# Patient Record
Sex: MALE | Race: WHITE | URBAN - METROPOLITAN AREA
[De-identification: names, ages, dates, MRNs, and addresses within clinical notes are randomized per-mention and may not be internally consistent; named-entity substitution may affect disease eponyms.]

---

## 2022-03-18 PROBLEM — N41.1 CHRONIC PROSTATITIS: Status: ACTIVE | Noted: 2017-08-31

## 2022-03-18 PROBLEM — M54.12 CERVICAL RADICULOPATHY: Status: ACTIVE | Noted: 2020-04-15

## 2022-03-19 PROBLEM — M46.1 SACROILIITIS (HCC): Status: ACTIVE | Noted: 2017-09-20

## 2022-03-19 PROBLEM — R31.9 HEMATURIA: Status: ACTIVE | Noted: 2017-08-31

## 2022-03-19 PROBLEM — M54.50 ACUTE MIDLINE LOW BACK PAIN WITHOUT SCIATICA: Status: ACTIVE | Noted: 2020-04-15

## 2022-03-19 PROBLEM — M48.02 DEGENERATIVE CERVICAL SPINAL STENOSIS: Status: ACTIVE | Noted: 2020-04-15

## 2022-03-20 PROBLEM — R31.29 MICROHEMATURIA: Status: ACTIVE | Noted: 2017-05-31

## 2022-03-20 PROBLEM — N28.1 RENAL CYST, RIGHT: Status: ACTIVE | Noted: 2017-09-20

## 2024-01-15 SDOH — HEALTH STABILITY: PHYSICAL HEALTH: ON AVERAGE, HOW MANY MINUTES DO YOU ENGAGE IN EXERCISE AT THIS LEVEL?: 120 MIN

## 2024-01-15 SDOH — HEALTH STABILITY: PHYSICAL HEALTH: ON AVERAGE, HOW MANY DAYS PER WEEK DO YOU ENGAGE IN MODERATE TO STRENUOUS EXERCISE (LIKE A BRISK WALK)?: 3 DAYS

## 2024-01-16 ENCOUNTER — OFFICE VISIT (OUTPATIENT)
Dept: FAMILY MEDICINE CLINIC | Facility: CLINIC | Age: 69
End: 2024-01-16
Payer: MEDICARE

## 2024-01-16 VITALS
RESPIRATION RATE: 16 BRPM | BODY MASS INDEX: 29.89 KG/M2 | DIASTOLIC BLOOD PRESSURE: 70 MMHG | HEIGHT: 66 IN | SYSTOLIC BLOOD PRESSURE: 130 MMHG | OXYGEN SATURATION: 97 % | WEIGHT: 186 LBS | HEART RATE: 82 BPM | TEMPERATURE: 98.1 F

## 2024-01-16 DIAGNOSIS — I10 PRIMARY HYPERTENSION: ICD-10-CM

## 2024-01-16 DIAGNOSIS — Z87.891 FORMER SMOKER: ICD-10-CM

## 2024-01-16 DIAGNOSIS — Z76.89 ESTABLISHING CARE WITH NEW DOCTOR, ENCOUNTER FOR: Primary | ICD-10-CM

## 2024-01-16 DIAGNOSIS — G47.33 OSA (OBSTRUCTIVE SLEEP APNEA): ICD-10-CM

## 2024-01-16 PROCEDURE — 1036F TOBACCO NON-USER: CPT | Performed by: STUDENT IN AN ORGANIZED HEALTH CARE EDUCATION/TRAINING PROGRAM

## 2024-01-16 PROCEDURE — 3075F SYST BP GE 130 - 139MM HG: CPT | Performed by: STUDENT IN AN ORGANIZED HEALTH CARE EDUCATION/TRAINING PROGRAM

## 2024-01-16 PROCEDURE — 3017F COLORECTAL CA SCREEN DOC REV: CPT | Performed by: STUDENT IN AN ORGANIZED HEALTH CARE EDUCATION/TRAINING PROGRAM

## 2024-01-16 PROCEDURE — 1123F ACP DISCUSS/DSCN MKR DOCD: CPT | Performed by: STUDENT IN AN ORGANIZED HEALTH CARE EDUCATION/TRAINING PROGRAM

## 2024-01-16 PROCEDURE — G8484 FLU IMMUNIZE NO ADMIN: HCPCS | Performed by: STUDENT IN AN ORGANIZED HEALTH CARE EDUCATION/TRAINING PROGRAM

## 2024-01-16 PROCEDURE — 3078F DIAST BP <80 MM HG: CPT | Performed by: STUDENT IN AN ORGANIZED HEALTH CARE EDUCATION/TRAINING PROGRAM

## 2024-01-16 PROCEDURE — 99214 OFFICE O/P EST MOD 30 MIN: CPT | Performed by: STUDENT IN AN ORGANIZED HEALTH CARE EDUCATION/TRAINING PROGRAM

## 2024-01-16 PROCEDURE — G8417 CALC BMI ABV UP PARAM F/U: HCPCS | Performed by: STUDENT IN AN ORGANIZED HEALTH CARE EDUCATION/TRAINING PROGRAM

## 2024-01-16 PROCEDURE — G8427 DOCREV CUR MEDS BY ELIG CLIN: HCPCS | Performed by: STUDENT IN AN ORGANIZED HEALTH CARE EDUCATION/TRAINING PROGRAM

## 2024-01-16 SDOH — ECONOMIC STABILITY: INCOME INSECURITY: HOW HARD IS IT FOR YOU TO PAY FOR THE VERY BASICS LIKE FOOD, HOUSING, MEDICAL CARE, AND HEATING?: NOT HARD AT ALL

## 2024-01-16 SDOH — ECONOMIC STABILITY: HOUSING INSECURITY
IN THE LAST 12 MONTHS, WAS THERE A TIME WHEN YOU DID NOT HAVE A STEADY PLACE TO SLEEP OR SLEPT IN A SHELTER (INCLUDING NOW)?: NO

## 2024-01-16 SDOH — ECONOMIC STABILITY: FOOD INSECURITY: WITHIN THE PAST 12 MONTHS, THE FOOD YOU BOUGHT JUST DIDN'T LAST AND YOU DIDN'T HAVE MONEY TO GET MORE.: NEVER TRUE

## 2024-01-16 SDOH — ECONOMIC STABILITY: FOOD INSECURITY: WITHIN THE PAST 12 MONTHS, YOU WORRIED THAT YOUR FOOD WOULD RUN OUT BEFORE YOU GOT MONEY TO BUY MORE.: NEVER TRUE

## 2024-01-16 ASSESSMENT — ENCOUNTER SYMPTOMS
DIARRHEA: 0
COUGH: 0
CHEST TIGHTNESS: 0
NAUSEA: 0
SHORTNESS OF BREATH: 0
VOMITING: 0

## 2024-01-16 ASSESSMENT — PATIENT HEALTH QUESTIONNAIRE - PHQ9
SUM OF ALL RESPONSES TO PHQ9 QUESTIONS 1 & 2: 0
SUM OF ALL RESPONSES TO PHQ QUESTIONS 1-9: 0
1. LITTLE INTEREST OR PLEASURE IN DOING THINGS: 0
SUM OF ALL RESPONSES TO PHQ QUESTIONS 1-9: 0
2. FEELING DOWN, DEPRESSED OR HOPELESS: 0

## 2024-01-16 NOTE — PROGRESS NOTES
Dale A Schober is a 68 y.o. male (: 1955) presenting to address:    Chief Complaint   Patient presents with    Establish Care       Vitals:    24 1103   BP: 130/70   Pulse: 82   Resp: 16   Temp: 98.1 °F (36.7 °C)   SpO2: 97%       Coordination of Care Questionaire:   1. \"Have you been to the ER, urgent care clinic since your last visit?  Hospitalized since your last visit?\" No    2. \"Have you seen or consulted any other health care providers outside of the [facility] since your last visit?\" No     3. For patients aged 45-75: Has the patient had a colonoscopy / FIT/ Cologuard? No      If the patient is female:    4. For patients aged 40-74: Has the patient had a mammogram within the past 2 years? NA - based on age or sex      5. For patients aged 21-65: Has the patient had a pap smear? NA - based on age or sex    Advanced Directive:   1. Do you have an Advanced Directive? No    2. Would you like information on Advanced Directives? No    
OTHER SURGICAL HISTORY      biopsy    PROSTATE SURGERY  06:1998    Biopsy    UROLOGICAL SURGERY  2014    [facility], Dr. Nabil Sheldon, Transrectal ultrasound of prostate, multiple transperineal needle biopsies    UROLOGICAL SURGERY  2023    CYSTOSCOPY TRANSRECTAL ULTRASOUND OF THE PROSTATE; Dr Hameed       Family History   Problem Relation Age of Onset    Heart Attack Mother     Heart Attack Father     Heart Disease Paternal Uncle        No Active Allergies      Social History     Tobacco Use   Smoking Status Former    Current packs/day: 0.00    Average packs/day: 0.5 packs/day for 30.0 years (15.0 ttl pk-yrs)    Types: Cigarettes    Start date: 1970    Quit date: 2000    Years since quittin.0   Smokeless Tobacco Never       Social History     Substance and Sexual Activity   Alcohol Use Yes    Alcohol/week: 8.0 standard drinks of alcohol    Types: 4 Glasses of wine, 4 Cans of beer per week       Immunization History   Administered Date(s) Administered    Pneumococcal, PCV20, PREVNAR 20, (age 6w+), IM, 0.5mL 2024       Patient Active Problem List   Diagnosis    Cervical radiculopathy    Microscopic hematuria    Elevated prostate specific antigen (PSA)    Chronic prostatitis    Degenerative cervical spinal stenosis    Sacroiliitis (HCC)    Acute midline low back pain without sciatica    Tear meniscus knee    Hematuria    Prostatitis    ORALIA (obstructive sleep apnea)    Right knee pain    Renal cyst, right    Microhematuria    Impotence of organic origin         Current Outpatient Medications:     ezetimibe (ZETIA) 10 MG tablet, take 1 tablet by mouth once daily for cholesterol, Disp: , Rfl:     olmesartan (BENICAR) 40 MG tablet, Take 1 tablet by mouth daily for blood pressure, Disp: , Rfl:     rosuvastatin (CRESTOR) 40 MG tablet, , Disp: , Rfl:     tamsulosin (FLOMAX) 0.4 MG capsule, Take 1 capsule by mouth daily, Disp: 90 capsule, Rfl: 3    fluticasone (FLONASE) 50 MCG/ACT nasal spray,

## 2024-04-09 ENCOUNTER — TELEPHONE (OUTPATIENT)
Dept: FAMILY MEDICINE CLINIC | Facility: CLINIC | Age: 69
End: 2024-04-09

## 2024-04-09 NOTE — TELEPHONE ENCOUNTER
Pt was transferred by nurse triage for sob. Spoke with pt whom stated that he is requesting a referral for a CT scan due to nodule found on his lungs previously. [facility]

## 2024-09-17 ENCOUNTER — OFFICE VISIT (OUTPATIENT)
Dept: FAMILY MEDICINE CLINIC | Facility: CLINIC | Age: 69
End: 2024-09-17
Payer: MEDICARE

## 2024-09-17 VITALS
BODY MASS INDEX: 30.25 KG/M2 | HEART RATE: 78 BPM | HEIGHT: 66 IN | TEMPERATURE: 98 F | SYSTOLIC BLOOD PRESSURE: 131 MMHG | OXYGEN SATURATION: 98 % | DIASTOLIC BLOOD PRESSURE: 69 MMHG | RESPIRATION RATE: 16 BRPM | WEIGHT: 188.2 LBS

## 2024-09-17 DIAGNOSIS — I10 PRIMARY HYPERTENSION: Primary | ICD-10-CM

## 2024-09-17 DIAGNOSIS — R20.0 BILATERAL HAND NUMBNESS: ICD-10-CM

## 2024-09-17 DIAGNOSIS — E78.2 MIXED HYPERLIPIDEMIA: ICD-10-CM

## 2024-09-17 DIAGNOSIS — Z13.29 SCREENING FOR ENDOCRINE DISORDER: ICD-10-CM

## 2024-09-17 DIAGNOSIS — E66.3 OVERWEIGHT (BMI 25.0-29.9): ICD-10-CM

## 2024-09-17 PROBLEM — R31.9 HEMATURIA: Status: RESOLVED | Noted: 2017-08-31 | Resolved: 2024-09-17

## 2024-09-17 PROBLEM — M46.1 SACROILIITIS (HCC): Status: RESOLVED | Noted: 2017-09-20 | Resolved: 2024-09-17

## 2024-09-17 PROBLEM — M54.50 ACUTE MIDLINE LOW BACK PAIN WITHOUT SCIATICA: Status: RESOLVED | Noted: 2020-04-15 | Resolved: 2024-09-17

## 2024-09-17 PROCEDURE — 90653 IIV ADJUVANT VACCINE IM: CPT | Performed by: STUDENT IN AN ORGANIZED HEALTH CARE EDUCATION/TRAINING PROGRAM

## 2024-09-17 PROCEDURE — G0008 ADMIN INFLUENZA VIRUS VAC: HCPCS | Performed by: STUDENT IN AN ORGANIZED HEALTH CARE EDUCATION/TRAINING PROGRAM

## 2024-09-17 PROCEDURE — G8427 DOCREV CUR MEDS BY ELIG CLIN: HCPCS | Performed by: STUDENT IN AN ORGANIZED HEALTH CARE EDUCATION/TRAINING PROGRAM

## 2024-09-17 PROCEDURE — 1036F TOBACCO NON-USER: CPT | Performed by: STUDENT IN AN ORGANIZED HEALTH CARE EDUCATION/TRAINING PROGRAM

## 2024-09-17 PROCEDURE — 3017F COLORECTAL CA SCREEN DOC REV: CPT | Performed by: STUDENT IN AN ORGANIZED HEALTH CARE EDUCATION/TRAINING PROGRAM

## 2024-09-17 PROCEDURE — 3075F SYST BP GE 130 - 139MM HG: CPT | Performed by: STUDENT IN AN ORGANIZED HEALTH CARE EDUCATION/TRAINING PROGRAM

## 2024-09-17 PROCEDURE — 99214 OFFICE O/P EST MOD 30 MIN: CPT | Performed by: STUDENT IN AN ORGANIZED HEALTH CARE EDUCATION/TRAINING PROGRAM

## 2024-09-17 PROCEDURE — 3078F DIAST BP <80 MM HG: CPT | Performed by: STUDENT IN AN ORGANIZED HEALTH CARE EDUCATION/TRAINING PROGRAM

## 2024-09-17 PROCEDURE — 1123F ACP DISCUSS/DSCN MKR DOCD: CPT | Performed by: STUDENT IN AN ORGANIZED HEALTH CARE EDUCATION/TRAINING PROGRAM

## 2024-09-17 PROCEDURE — G8417 CALC BMI ABV UP PARAM F/U: HCPCS | Performed by: STUDENT IN AN ORGANIZED HEALTH CARE EDUCATION/TRAINING PROGRAM

## 2024-09-17 RX ORDER — NIFEDIPINE 60 MG/1
1 TABLET, EXTENDED RELEASE ORAL DAILY
COMMUNITY
Start: 2024-07-11

## 2024-09-17 ASSESSMENT — ENCOUNTER SYMPTOMS
DIARRHEA: 0
CHEST TIGHTNESS: 0
COUGH: 0
SHORTNESS OF BREATH: 0
NAUSEA: 0
VOMITING: 0

## 2024-10-25 ENCOUNTER — OFFICE VISIT (OUTPATIENT)
Age: 69
End: 2024-10-25

## 2024-10-25 VITALS — BODY MASS INDEX: 31.12 KG/M2 | HEIGHT: 66 IN | WEIGHT: 193.6 LBS

## 2024-10-25 DIAGNOSIS — G56.03 BILATERAL CARPAL TUNNEL SYNDROME: Primary | ICD-10-CM

## 2024-10-25 RX ORDER — SEMAGLUTIDE 0.5 MG/.5ML
0.5 INJECTION, SOLUTION SUBCUTANEOUS
COMMUNITY
Start: 2024-10-10

## 2024-10-25 RX ORDER — NITROGLYCERIN 0.4 MG/1
TABLET SUBLINGUAL
COMMUNITY
Start: 2024-10-22

## 2024-10-25 NOTE — PROGRESS NOTES
Dale A Schober is a 69 y.o. male right handed.  Worker's Compensation and legal considerations: none    Chief Complaint   Patient presents with    Hand Pain     Bilateral     Pain Score:   0 - No pain    Subjective:     Initial HPI: Patient presents today with complaints of bilateral hand numbness and tingling.  He reports it to start with the left first and then the right.    Date of onset: Approximately 2020  Injury: Yes: Comment: Remote feeling immediate pain when moving some cans.  Prior Treatment:  No  Contributory history: None    ROS: Review of Systems - General ROS: negative except HPI    Past Medical History:   Diagnosis Date    CAD (coronary artery disease) 06/05/2000    Elevated prostate specific antigen (PSA)     Hypertension     Impotence of organic origin     Microscopic hematuria     ORALIA (obstructive sleep apnea)     uses cpap machine every night    Right knee pain     Tear meniscus knee        Past Surgical History:   Procedure Laterality Date    EYE SURGERY  03/03/2022    Cataract    ORTHOPEDIC SURGERY      4 total orthopaedic surgeries    OTHER SURGICAL HISTORY  12/09    biopsy    OTHER SURGICAL HISTORY      Knee    OTHER SURGICAL HISTORY  12/07    biopsy    PROSTATE SURGERY  06:06/1998    Biopsy    UROLOGICAL SURGERY  1/20/2014    [facility], Dr. Nabil Sheldon, Transrectal ultrasound of prostate, multiple transperineal needle biopsies    UROLOGICAL SURGERY  08/03/2023    CYSTOSCOPY TRANSRECTAL ULTRASOUND OF THE PROSTATE; Dr Hameed        Current Outpatient Medications   Medication Sig Dispense Refill    Semaglutide-Weight Management (WEGOVY) 0.5 MG/0.5ML SOAJ SC injection Inject 0.5 mg into the skin      NITROSTAT 0.4 MG SL tablet       NIFEdipine (PROCARDIA XL) 60 MG extended release tablet Take 1 tablet by mouth daily      tamsulosin (FLOMAX) 0.4 MG capsule take 1 capsule by mouth once daily (Patient not taking: Reported on 9/17/2024) 30 capsule 0    ezetimibe (ZETIA) 10 MG tablet take 1 tablet by

## 2024-10-31 ENCOUNTER — OFFICE VISIT (OUTPATIENT)
Dept: FAMILY MEDICINE CLINIC | Facility: CLINIC | Age: 69
End: 2024-10-31
Payer: MEDICARE

## 2024-10-31 VITALS
OXYGEN SATURATION: 97 % | BODY MASS INDEX: 30.92 KG/M2 | RESPIRATION RATE: 16 BRPM | TEMPERATURE: 98.2 F | WEIGHT: 192.4 LBS | HEART RATE: 93 BPM | HEIGHT: 66 IN | SYSTOLIC BLOOD PRESSURE: 122 MMHG | DIASTOLIC BLOOD PRESSURE: 75 MMHG

## 2024-10-31 DIAGNOSIS — Z71.89 ACP (ADVANCE CARE PLANNING): ICD-10-CM

## 2024-10-31 DIAGNOSIS — Z00.00 MEDICARE ANNUAL WELLNESS VISIT, SUBSEQUENT: Primary | ICD-10-CM

## 2024-10-31 PROCEDURE — 1126F AMNT PAIN NOTED NONE PRSNT: CPT | Performed by: STUDENT IN AN ORGANIZED HEALTH CARE EDUCATION/TRAINING PROGRAM

## 2024-10-31 PROCEDURE — 1160F RVW MEDS BY RX/DR IN RCRD: CPT | Performed by: STUDENT IN AN ORGANIZED HEALTH CARE EDUCATION/TRAINING PROGRAM

## 2024-10-31 PROCEDURE — 3017F COLORECTAL CA SCREEN DOC REV: CPT | Performed by: STUDENT IN AN ORGANIZED HEALTH CARE EDUCATION/TRAINING PROGRAM

## 2024-10-31 PROCEDURE — 1123F ACP DISCUSS/DSCN MKR DOCD: CPT | Performed by: STUDENT IN AN ORGANIZED HEALTH CARE EDUCATION/TRAINING PROGRAM

## 2024-10-31 PROCEDURE — 1159F MED LIST DOCD IN RCRD: CPT | Performed by: STUDENT IN AN ORGANIZED HEALTH CARE EDUCATION/TRAINING PROGRAM

## 2024-10-31 PROCEDURE — 99497 ADVNCD CARE PLAN 30 MIN: CPT | Performed by: STUDENT IN AN ORGANIZED HEALTH CARE EDUCATION/TRAINING PROGRAM

## 2024-10-31 PROCEDURE — G0439 PPPS, SUBSEQ VISIT: HCPCS | Performed by: STUDENT IN AN ORGANIZED HEALTH CARE EDUCATION/TRAINING PROGRAM

## 2024-10-31 PROCEDURE — G8482 FLU IMMUNIZE ORDER/ADMIN: HCPCS | Performed by: STUDENT IN AN ORGANIZED HEALTH CARE EDUCATION/TRAINING PROGRAM

## 2024-10-31 SDOH — HEALTH STABILITY: PHYSICAL HEALTH: ON AVERAGE, HOW MANY MINUTES DO YOU ENGAGE IN EXERCISE AT THIS LEVEL?: 40 MIN

## 2024-10-31 SDOH — HEALTH STABILITY: PHYSICAL HEALTH: ON AVERAGE, HOW MANY DAYS PER WEEK DO YOU ENGAGE IN MODERATE TO STRENUOUS EXERCISE (LIKE A BRISK WALK)?: 4 DAYS

## 2024-10-31 ASSESSMENT — PATIENT HEALTH QUESTIONNAIRE - PHQ9
SUM OF ALL RESPONSES TO PHQ QUESTIONS 1-9: 0
SUM OF ALL RESPONSES TO PHQ QUESTIONS 1-9: 0
1. LITTLE INTEREST OR PLEASURE IN DOING THINGS: NOT AT ALL
2. FEELING DOWN, DEPRESSED OR HOPELESS: NOT AT ALL
SUM OF ALL RESPONSES TO PHQ QUESTIONS 1-9: 0
SUM OF ALL RESPONSES TO PHQ9 QUESTIONS 1 & 2: 0
SUM OF ALL RESPONSES TO PHQ QUESTIONS 1-9: 0

## 2024-10-31 ASSESSMENT — ENCOUNTER SYMPTOMS
COUGH: 0
NAUSEA: 0
DIARRHEA: 0
CHEST TIGHTNESS: 0
VOMITING: 0
SHORTNESS OF BREATH: 0

## 2024-10-31 ASSESSMENT — LIFESTYLE VARIABLES
HOW OFTEN DO YOU HAVE A DRINK CONTAINING ALCOHOL: 2-3 TIMES A WEEK
HOW MANY STANDARD DRINKS CONTAINING ALCOHOL DO YOU HAVE ON A TYPICAL DAY: 1
HOW OFTEN DO YOU HAVE A DRINK CONTAINING ALCOHOL: 4
HOW OFTEN DO YOU HAVE SIX OR MORE DRINKS ON ONE OCCASION: 1
HOW MANY STANDARD DRINKS CONTAINING ALCOHOL DO YOU HAVE ON A TYPICAL DAY: 1 OR 2

## 2024-10-31 NOTE — PROGRESS NOTES
Dale A Schober is a 69 y.o. male (: 1955) presenting to address:    Chief Complaint   Patient presents with    Medicare AWV       Vitals:    10/31/24 0801   BP: (!) 151/71   Pulse: 93   Resp: 16   Temp: 98.2 °F (36.8 °C)   SpO2: 97%       \"Have you been to the ER, urgent care clinic since your last visit?  Hospitalized since your last visit?\"    NO    “Have you seen or consulted any other health care providers outside of [facility] since your last visit?”    YES - When: approximately 3 days ago.  Where and Why: urology , ortho .

## 2024-10-31 NOTE — PROGRESS NOTES
Medicare Annual Wellness Visit    Dale A Schober is here for Medicare AWV    Assessment & Plan   Medicare annual wellness visit, subsequent  Comments:  labs and vaccines UTD, fu in 6 mo  ACP (advance care planning)  -     [place] Advanced Care Planning (16-30 minutes) [80494]  Recommendations for Preventive Services Due: see orders and patient instructions/AVS.  Recommended screening schedule for the next 5-10 years is provided to the patient in written form: see Patient Instructions/AVS.     Return in 6 months (on 4/30/2025) for HTN.     Subjective       Patient's complete Health Risk Assessment and screening values have been reviewed and are found in Flowsheets. The following problems were reviewed today and where indicated follow up appointments were made and/or referrals ordered.    Positive Risk Factor Screenings with Interventions:                  Abnormal BMI (obese):  Body mass index is 31.52 kg/m². (!) Abnormal  Interventions:  Currently on zepbound           Safety:  Do you have non-slip mats or non-slip surfaces or shower bars or grab bars in your shower or bathtub?: (!) No  Interventions:  Not needed at this time                   Objective   Vitals:    10/31/24 0801 10/31/24 0829   BP: (!) 151/71 122/75   Site: Right Upper Arm Right Upper Arm   Position: Sitting Sitting   Cuff Size: Large Adult Large Adult   Pulse: 93    Resp: 16    Temp: 98.2 °F (36.8 °C)    TempSrc: Temporal    SpO2: 97%    Weight: 87.3 kg (192 lb 6.4 oz)    Height: 1.664 m (5' 5.51\")       Body mass index is 31.52 kg/m².                  No Known Allergies  Prior to Visit Medications    Medication Sig Taking? Authorizing Provider   Semaglutide-Weight Management (WEGOVY) 0.5 MG/0.5ML SOAJ SC injection Inject 0.5 mg into the skin Yes Fred Dumas MD   NITROSTAT 0.4 MG SL tablet  Yes Fred Dumas MD   NIFEdipine (PROCARDIA XL) 60 MG extended release tablet Take 1 tablet by mouth daily Yes Fred Dumas MD

## 2024-10-31 NOTE — PROGRESS NOTES
[facility]    HISTORY OF PRESENT ILLNESS  Dale A Schober  is a 69 y.o. y.o. male here for FU and MWV.    HYPERTENSION, Essential  Reports Compliance with meds olmesartan 40, nifedipine 60   Denies Chest pain, shortness of breath, lower extremity edema, vision changes, change in urination.    No results found for: \"MALBCR\", \"NA\", \"K\", \"CL\", \"CO2\", \"GLUCOSE\", \"BUN\", \"CREATININE\", \"CALCIUM\", \"ALT\", \"AST\", \"ALKPHOS\", \"LABALBU\"    HLD  -On rosuvastatin 40, zetia 10  -seeing cardiology    Elevated PSA  -following urology  -also has hematuria  -taking flomax    Health Maintenance:    Depression Screen:       10/31/2024     6:08 AM   PHQ-9    Little interest or pleasure in doing things 0   Feeling down, depressed, or hopeless 0   PHQ-2 Score 0   PHQ-9 Total Score 0        HCV Screen:   No results found for: \"HEPCAB\"     Colon Cancer Screenin, repeat in 10 years  Prostate Cancer Screening:   Lab Results   Component Value Date    PSA 8.54 (H) 10/22/2024        Smoking Status:   Tobacco Use      Smoking status: Former        Packs/day: 0.00        Years: 0.5 packs/day for 30.0 years (15.0 ttl pk-yrs)        Types: Cigarettes        Start date: 1970        Quit date: 2000        Years since quittin.8      Smokeless tobacco: Never     Lung Cancer Screening:  CT Low Dose n/a  AAA Screening: wnl     Immunizations:  Flu vaccine- Recommended every fall  COVID vaccine primary series- complete  Tetanus- Tdap   Shingrix- series completed  Pneumovax 23-  N/A  Prevnar 20- at age 65     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

## 2024-11-06 DIAGNOSIS — G56.03 BILATERAL CARPAL TUNNEL SYNDROME: ICD-10-CM

## 2025-02-07 ENCOUNTER — OFFICE VISIT (OUTPATIENT)
Age: 70
End: 2025-02-07
Payer: MEDICARE

## 2025-02-07 VITALS — WEIGHT: 192 LBS | BODY MASS INDEX: 31.99 KG/M2 | HEIGHT: 65 IN

## 2025-02-07 DIAGNOSIS — G56.03 BILATERAL CARPAL TUNNEL SYNDROME: Primary | ICD-10-CM

## 2025-02-07 PROCEDURE — 1159F MED LIST DOCD IN RCRD: CPT | Performed by: ORTHOPAEDIC SURGERY

## 2025-02-07 PROCEDURE — 1126F AMNT PAIN NOTED NONE PRSNT: CPT | Performed by: ORTHOPAEDIC SURGERY

## 2025-02-07 PROCEDURE — G8417 CALC BMI ABV UP PARAM F/U: HCPCS | Performed by: ORTHOPAEDIC SURGERY

## 2025-02-07 PROCEDURE — G8427 DOCREV CUR MEDS BY ELIG CLIN: HCPCS | Performed by: ORTHOPAEDIC SURGERY

## 2025-02-07 PROCEDURE — 3017F COLORECTAL CA SCREEN DOC REV: CPT | Performed by: ORTHOPAEDIC SURGERY

## 2025-02-07 PROCEDURE — 99214 OFFICE O/P EST MOD 30 MIN: CPT | Performed by: ORTHOPAEDIC SURGERY

## 2025-02-07 PROCEDURE — 1123F ACP DISCUSS/DSCN MKR DOCD: CPT | Performed by: ORTHOPAEDIC SURGERY

## 2025-02-07 PROCEDURE — 1036F TOBACCO NON-USER: CPT | Performed by: ORTHOPAEDIC SURGERY

## 2025-02-07 PROCEDURE — 1160F RVW MEDS BY RX/DR IN RCRD: CPT | Performed by: ORTHOPAEDIC SURGERY

## 2025-02-07 RX ORDER — EZETIMIBE 10 MG/1
10 TABLET ORAL DAILY
COMMUNITY
Start: 2025-01-04

## 2025-02-07 NOTE — PROGRESS NOTES
Dale A Schober is a 69 y.o. male right handed.  Worker's Compensation and legal considerations: none    Chief Complaint   Patient presents with    Follow-up     Bilateral hand     Pain Score:   0 - No pain    Subjective:     2/7/2025 HPI: Patient presents today for follow-up of bilateral upper extremity EMGs.  He reports since wearing his braces his symptoms have been minimal to none.    Initial HPI: Patient presents today with complaints of bilateral hand numbness and tingling.  He reports it to start with the left first and then the right.    Date of onset: Approximately 2020  Injury: Yes: Comment: Remote feeling immediate pain when moving some cans.  Prior Treatment:  No  Contributory history: None    ROS: Review of Systems - General ROS: negative except HPI    Past Medical History:   Diagnosis Date    CAD (coronary artery disease) 06/05/2000    Elevated prostate specific antigen (PSA)     Hypertension     Impotence of organic origin     Microscopic hematuria     ORALIA (obstructive sleep apnea)     uses cpap machine every night    Right knee pain     Tear meniscus knee        Past Surgical History:   Procedure Laterality Date    EYE SURGERY  03/03/2022    Cataract    ORTHOPEDIC SURGERY      4 total orthopaedic surgeries    OTHER SURGICAL HISTORY  12/09    biopsy    OTHER SURGICAL HISTORY      Knee    OTHER SURGICAL HISTORY  12/07    biopsy    PROSTATE SURGERY  06:06/1998    Biopsy    UROLOGICAL SURGERY  1/20/2014    [facility], Dr. Nabil Sheldon, Transrectal ultrasound of prostate, multiple transperineal needle biopsies    UROLOGICAL SURGERY  08/03/2023    CYSTOSCOPY TRANSRECTAL ULTRASOUND OF THE PROSTATE; Dr Hameed        Current Outpatient Medications   Medication Sig Dispense Refill    ezetimibe (ZETIA) 10 MG tablet Take 1 tablet by mouth daily      Semaglutide-Weight Management (WEGOVY) 0.5 MG/0.5ML SOAJ SC injection Inject 0.5 mg into the skin      NITROSTAT 0.4 MG SL tablet       NIFEdipine (PROCARDIA XL) 60 MG

## 2025-04-29 ENCOUNTER — TELEPHONE (OUTPATIENT)
Dept: FAMILY MEDICINE CLINIC | Facility: CLINIC | Age: 70
End: 2025-04-29

## 2025-04-29 NOTE — TELEPHONE ENCOUNTER
LVM informing pt that appt on May 1st has been cancelled due to PCP not being in office. Instructed pt to call office to reschedule appt